# Patient Record
(demographics unavailable — no encounter records)

---

## 2024-11-26 NOTE — ASSESSMENT
[FreeTextEntry1] : In summary patient is a 40-year-old female who presents for obesity medicine follow-up.  She has been started on Wegovy and continues to do well.  Will increase to 2.4 mg as tolerated.  New prescription placed.  I will see her in 3 months for follow-up.  Recommended increasing her physical activity to a minimum 150 minutes/week of both cardiovascular and weight training methods.  Will continue to work on caloric reduction and focus on lean high-quality proteins with a decrease in carbohydrates fats and sugars.  Patient verbalized understanding and agrees with the mentioned above plan.

## 2024-11-26 NOTE — HISTORY OF PRESENT ILLNESS
[Home] : at home, [unfilled] , at the time of the visit. 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   Patient was initially taking Saxenda phentermine combination. Has discontinued phentermine due to lack of efficacy. Has been only taking 1.8 mg of Saxenda daily due to limited supply. Discussed several options including increasing Saxenda dose, or try to obtain authorization for Zepbound   was started on Zepbopund but later switched to Wegovy due to availability issues. Currently on wegovy 1.7mg dose.   Patient down 12 pounds since last being seen 6 months ago.  Her weight loss has been slow and steady.  She has received 3 months of the 1.7 mg dose.  Would like to increase Wegovy to 2.4 mg as she reports no negative adverse side effects.  Will place new prescription to [facility] mail order pharmacy.  I will see her in early March for follow-up.  At that time we will obtain new blood work to assess vitamin levels as well as hemoglobin A1c, glucose, liver kidney and pancreatic function.  Patient will continue to increase her physical activity as tolerated and discussed.

## 2024-11-26 NOTE — HISTORY OF PRESENT ILLNESS
[Home] : at home, [unfilled] , at the time of the visit. 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   Patient was initially taking Saxenda phentermine combination. Has discontinued phentermine due to lack of efficacy. Has been only taking 1.8 mg of Saxenda daily due to limited supply. Discussed several options including increasing Saxenda dose, or try to obtain authorization for Zepbound   was started on Zepbopund but later switched to Wegovy due to availability issues. Currently on wegovy 1.7mg dose.   Patient down 12 pounds since last being seen 6 months ago.  Her weight loss has been slow and steady.  She has received 3 months of the 1.7 mg dose.  Would like to increase Wegovy to 2.4 mg as she reports no negative adverse side effects.  Will place new prescription to [facility] mail order pharmacy.  I will see her in early March for follow-up.  At that time we will obtain new blood work to assess vitamin levels as well as hemoglobin A1c, glucose, liver kidney and pancreatic function.  Patient will continue to increase her physical activity as tolerated and discussed.

## 2025-03-04 NOTE — HISTORY OF PRESENT ILLNESS
[Home] : at home, [unfilled] , at the time of the visit. [Medical Office: ([facility])___] : at the medical office located in  [Telehealth (audio & video)] : This visit was provided via telehealth using real-time 2-way audio visual technology. 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   Patient was initially taking Saxenda phentermine combination. Has discontinued phentermine due to lack of efficacy. Has been only taking 1.8 mg of Saxenda daily due to limited supply. Discussed several options including increasing Saxenda dose, or try to obtain authorization for Zepbound  was started on Zepbopund but later switched to Wegovy due to availability issues. Currently on wegovy 2.4mg dose.  Patient down 8 pounds since last being seen 3 months ago.  Overall doing well however weight loss has slowed down.  Patient reports she has not been physically active.  I encouraged increasing her physical activity specifically weight training and resistance training methods.  Patient verbalized understanding.  Patient needs new authorization for Wegovy.  New 90-day prescription sent to [facility] mail order pharmacy with instructions for [facility] to complete authorization process.  I will see patient in July for follow-up.

## 2025-03-04 NOTE — ASSESSMENT
[FreeTextEntry1] : In summary, patient presents for obesity medicine follow-up.  Is doing well on Wegovy and happy she is under 200 pounds.  Current BMI 34.4.  Will see patient in 3 months to 4 months for follow-up.  Will obtain authorization for Wegovy.  Will continue to increase physical activity specifically weight training methods.  All questions and concerns answered at today's visit.

## 2025-03-04 NOTE — HISTORY OF PRESENT ILLNESS
[Home] : at home, [unfilled] , at the time of the visit. [Medical Office: ([facility])___] : at the medical office located in  [Telehealth (audio & video)] : This visit was provided via telehealth using real-time 2-way audio visual technology. 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   Patient was initially taking Saxenda phentermine combination. Has discontinued phentermine due to lack of efficacy. Has been only taking 1.8 mg of Saxenda daily due to limited supply. Discussed several options including increasing Saxenda dose, or try to obtain authorization for Zepbound  was started on Zepbopund but later switched to Wegovy due to availability issues. Currently on wegovy 2.4mg dose.  Patient down 8 pounds since last being seen 3 months ago.  Overall doing well however weight loss has slowed down.  Patient reports she has not been physically active.  I encouraged increasing her physical activity specifically weight training and resistance training methods.  Patient verbalized understanding.  Patient needs new authorization for Wegovy.  New 90-day prescription sent to [facility] mail order pharmacy with instructions for [facility] to complete authorization process.  I will see patient in July for follow-up.

## 2025-03-26 NOTE — PLAN
[FreeTextEntry1] : chk bw cont rx reinforced f/u w/ gyn onc and gyn oxybutynin; Kegel exercises smoking cessation encouraged f/u w/ uro if no improvement

## 2025-03-26 NOTE — PHYSICAL EXAM
[No Acute Distress] : no acute distress [Well-Appearing] : well-appearing [Normal Sclera/Conjunctiva] : normal sclera/conjunctiva [PERRL] : pupils equal round and reactive to light [EOMI] : extraocular movements intact [Normal Outer Ear/Nose] : the outer ears and nose were normal in appearance [Normal Oropharynx] : the oropharynx was normal [No JVD] : no jugular venous distention [No Lymphadenopathy] : no lymphadenopathy [Supple] : supple [Thyroid Normal, No Nodules] : the thyroid was normal and there were no nodules present [No Respiratory Distress] : no respiratory distress  [No Accessory Muscle Use] : no accessory muscle use [Clear to Auscultation] : lungs were clear to auscultation bilaterally [Normal Rate] : normal rate  [Regular Rhythm] : with a regular rhythm [Normal S1, S2] : normal S1 and S2 [No Murmur] : no murmur heard [No Abdominal Bruit] : a ~M bruit was not heard ~T in the abdomen [No Varicosities] : no varicosities [Pedal Pulses Present] : the pedal pulses are present [No Edema] : there was no peripheral edema [No Palpable Aorta] : no palpable aorta [No Extremity Clubbing/Cyanosis] : no extremity clubbing/cyanosis [Soft] : abdomen soft [Non Tender] : non-tender [Non-distended] : non-distended [No HSM] : no HSM [Normal Bowel Sounds] : normal bowel sounds [Normal Posterior Cervical Nodes] : no posterior cervical lymphadenopathy [Normal Anterior Cervical Nodes] : no anterior cervical lymphadenopathy [No CVA Tenderness] : no CVA  tenderness [No Spinal Tenderness] : no spinal tenderness [No Joint Swelling] : no joint swelling [Grossly Normal Strength/Tone] : grossly normal strength/tone [No Rash] : no rash [Coordination Grossly Intact] : coordination grossly intact [No Focal Deficits] : no focal deficits [Normal Gait] : normal gait [Normal Affect] : the affect was normal [Normal Insight/Judgement] : insight and judgment were intact [Normal TMs] : both tympanic membranes were normal [Normal Appearance] : normal in appearance [No Masses] : no palpable masses [No Axillary Lymphadenopathy] : no axillary lymphadenopathy [Alert and Oriented x3] : oriented to person, place, and time [de-identified] : overwgt

## 2025-03-26 NOTE — HEALTH RISK ASSESSMENT
[Good] : ~his/her~  mood as  good [Yes] : Yes [2 - 4 times a month (2 pts)] : 2-4 times a month (2 points) [1 or 2 (0 pts)] : 1 or 2 (0 points) [Never (0 pts)] : Never (0 points) [No] : In the past 12 months have you used drugs other than those required for medical reasons? No [0] : 2) Feeling down, depressed, or hopeless: Not at all (0) [Current] : Current [NO] : No [HIV Test offered] : HIV Test offered [Hepatitis C test offered] : Hepatitis C test offered [With Significant Other] : lives with significant other [Employed] : employed [] :  [# Of Children ___] : has [unfilled] children [Sexually Active] : sexually active [Feels Safe at Home] : Feels safe at home [Fully functional (bathing, dressing, toileting, transferring, walking, feeding)] : Fully functional (bathing, dressing, toileting, transferring, walking, feeding) [Fully functional (using the telephone, shopping, preparing meals, housekeeping, doing laundry, using] : Fully functional and needs no help or supervision to perform IADLs (using the telephone, shopping, preparing meals, housekeeping, doing laundry, using transportation, managing medications and managing finances) [de-identified] : derm [de-identified] : trying [de-identified] : carbs and proteins [Change in mental status noted] : No change in mental status noted [Language] : denies difficulty with language [Handling Complex Tasks] : denies difficulty handling complex tasks [High Risk Behavior] : high risk behavior [Reports changes in hearing] : Reports no changes in hearing [Reports changes in vision] : Reports no changes in vision [Reports changes in dental health] : Reports no changes in dental health [MammogramDate] : 2023 [PapSmearDate] : 10/24 [FreeTextEntry2] : [facility]

## 2025-03-26 NOTE — HISTORY OF PRESENT ILLNESS
[FreeTextEntry1] : 40 year old female who presents today for a complete physical exam. c/o brown d/c on and off, last episode was 2 wks ago, states went to see gyn in oct 2024, told to have BV, given vaginal cream w/ mild relief

## 2025-07-01 NOTE — HISTORY OF PRESENT ILLNESS
[Home] : at home, [unfilled] , at the time of the visit. [Medical Office: ([facility])___] : at the medical office located in  [Telehealth (audio & video)] : This visit was provided via telehealth using real-time 2-way audio visual technology. 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   Patient was initially taking Saxenda phentermine combination. Has discontinued phentermine due to lack of efficacy. Has been only taking 1.8 mg of Saxenda daily due to limited supply. Discussed several options including increasing Saxenda dose, or try to obtain authorization for Zepbound  was started on Zepbopund but later switched to Wegovy due to availability issues. Currently on wegovy 2.4mg dose.